# Patient Record
Sex: MALE | Race: WHITE | NOT HISPANIC OR LATINO | Employment: OTHER | URBAN - METROPOLITAN AREA
[De-identification: names, ages, dates, MRNs, and addresses within clinical notes are randomized per-mention and may not be internally consistent; named-entity substitution may affect disease eponyms.]

---

## 2021-05-29 ENCOUNTER — APPOINTMENT (OUTPATIENT)
Dept: RADIOLOGY | Facility: MEDICAL CENTER | Age: 66
End: 2021-05-29
Attending: EMERGENCY MEDICINE
Payer: MEDICARE

## 2021-05-29 ENCOUNTER — HOSPITAL ENCOUNTER (OUTPATIENT)
Facility: MEDICAL CENTER | Age: 66
End: 2021-05-30
Attending: EMERGENCY MEDICINE | Admitting: STUDENT IN AN ORGANIZED HEALTH CARE EDUCATION/TRAINING PROGRAM
Payer: MEDICARE

## 2021-05-29 LAB
ALBUMIN SERPL BCP-MCNC: 3.6 G/DL (ref 3.2–4.9)
ALBUMIN/GLOB SERPL: 1.1 G/DL
ALP SERPL-CCNC: 66 U/L (ref 30–99)
ALT SERPL-CCNC: 16 U/L (ref 2–50)
ANION GAP SERPL CALC-SCNC: 9 MMOL/L (ref 7–16)
AST SERPL-CCNC: 18 U/L (ref 12–45)
BASOPHILS # BLD AUTO: 0.8 % (ref 0–1.8)
BASOPHILS # BLD: 0.07 K/UL (ref 0–0.12)
BILIRUB SERPL-MCNC: 0.6 MG/DL (ref 0.1–1.5)
BUN SERPL-MCNC: 26 MG/DL (ref 8–22)
CALCIUM SERPL-MCNC: 9 MG/DL (ref 8.5–10.5)
CHLORIDE SERPL-SCNC: 109 MMOL/L (ref 96–112)
CO2 SERPL-SCNC: 23 MMOL/L (ref 20–33)
CREAT SERPL-MCNC: 0.8 MG/DL (ref 0.5–1.4)
EKG IMPRESSION: NORMAL
EOSINOPHIL # BLD AUTO: 0.48 K/UL (ref 0–0.51)
EOSINOPHIL NFR BLD: 5.6 % (ref 0–6.9)
ERYTHROCYTE [DISTWIDTH] IN BLOOD BY AUTOMATED COUNT: 48.9 FL (ref 35.9–50)
GLOBULIN SER CALC-MCNC: 3.3 G/DL (ref 1.9–3.5)
GLUCOSE SERPL-MCNC: 101 MG/DL (ref 65–99)
HCT VFR BLD AUTO: 46.2 % (ref 42–52)
HGB BLD-MCNC: 15.2 G/DL (ref 14–18)
IMM GRANULOCYTES # BLD AUTO: 0.03 K/UL (ref 0–0.11)
IMM GRANULOCYTES NFR BLD AUTO: 0.3 % (ref 0–0.9)
LYMPHOCYTES # BLD AUTO: 3.17 K/UL (ref 1–4.8)
LYMPHOCYTES NFR BLD: 36.7 % (ref 22–41)
MCH RBC QN AUTO: 29.9 PG (ref 27–33)
MCHC RBC AUTO-ENTMCNC: 32.9 G/DL (ref 33.7–35.3)
MCV RBC AUTO: 90.8 FL (ref 81.4–97.8)
MONOCYTES # BLD AUTO: 0.81 K/UL (ref 0–0.85)
MONOCYTES NFR BLD AUTO: 9.4 % (ref 0–13.4)
NEUTROPHILS # BLD AUTO: 4.08 K/UL (ref 1.82–7.42)
NEUTROPHILS NFR BLD: 47.2 % (ref 44–72)
NRBC # BLD AUTO: 0 K/UL
NRBC BLD-RTO: 0 /100 WBC
PLATELET # BLD AUTO: 227 K/UL (ref 164–446)
PMV BLD AUTO: 8.8 FL (ref 9–12.9)
POTASSIUM SERPL-SCNC: 3.8 MMOL/L (ref 3.6–5.5)
PROT SERPL-MCNC: 6.9 G/DL (ref 6–8.2)
RBC # BLD AUTO: 5.09 M/UL (ref 4.7–6.1)
SODIUM SERPL-SCNC: 141 MMOL/L (ref 135–145)
TROPONIN T SERPL-MCNC: 16 NG/L (ref 6–19)
WBC # BLD AUTO: 8.6 K/UL (ref 4.8–10.8)

## 2021-05-29 PROCEDURE — 93005 ELECTROCARDIOGRAM TRACING: CPT | Performed by: EMERGENCY MEDICINE

## 2021-05-29 PROCEDURE — 700111 HCHG RX REV CODE 636 W/ 250 OVERRIDE (IP): Performed by: EMERGENCY MEDICINE

## 2021-05-29 PROCEDURE — 83690 ASSAY OF LIPASE: CPT

## 2021-05-29 PROCEDURE — 84484 ASSAY OF TROPONIN QUANT: CPT

## 2021-05-29 PROCEDURE — 96374 THER/PROPH/DIAG INJ IV PUSH: CPT

## 2021-05-29 PROCEDURE — 85730 THROMBOPLASTIN TIME PARTIAL: CPT

## 2021-05-29 PROCEDURE — 99285 EMERGENCY DEPT VISIT HI MDM: CPT

## 2021-05-29 PROCEDURE — 93005 ELECTROCARDIOGRAM TRACING: CPT

## 2021-05-29 PROCEDURE — 85025 COMPLETE CBC W/AUTO DIFF WBC: CPT

## 2021-05-29 PROCEDURE — 80053 COMPREHEN METABOLIC PANEL: CPT

## 2021-05-29 PROCEDURE — 94760 N-INVAS EAR/PLS OXIMETRY 1: CPT

## 2021-05-29 PROCEDURE — 71045 X-RAY EXAM CHEST 1 VIEW: CPT

## 2021-05-29 PROCEDURE — 36415 COLL VENOUS BLD VENIPUNCTURE: CPT

## 2021-05-29 PROCEDURE — 85610 PROTHROMBIN TIME: CPT

## 2021-05-29 PROCEDURE — 96375 TX/PRO/DX INJ NEW DRUG ADDON: CPT

## 2021-05-29 RX ORDER — MORPHINE SULFATE 10 MG/ML
10 INJECTION, SOLUTION INTRAMUSCULAR; INTRAVENOUS ONCE
Status: COMPLETED | OUTPATIENT
Start: 2021-05-30 | End: 2021-05-29

## 2021-05-29 RX ORDER — ONDANSETRON 2 MG/ML
4 INJECTION INTRAMUSCULAR; INTRAVENOUS ONCE
Status: COMPLETED | OUTPATIENT
Start: 2021-05-30 | End: 2021-05-29

## 2021-05-29 RX ADMIN — MORPHINE SULFATE 10 MG: 10 INJECTION INTRAVENOUS at 23:40

## 2021-05-29 RX ADMIN — ONDANSETRON 4 MG: 2 INJECTION INTRAMUSCULAR; INTRAVENOUS at 23:40

## 2021-05-30 ENCOUNTER — APPOINTMENT (OUTPATIENT)
Dept: CARDIOLOGY | Facility: MEDICAL CENTER | Age: 66
End: 2021-05-30
Attending: STUDENT IN AN ORGANIZED HEALTH CARE EDUCATION/TRAINING PROGRAM
Payer: MEDICARE

## 2021-05-30 ENCOUNTER — APPOINTMENT (OUTPATIENT)
Dept: RADIOLOGY | Facility: MEDICAL CENTER | Age: 66
End: 2021-05-30
Attending: STUDENT IN AN ORGANIZED HEALTH CARE EDUCATION/TRAINING PROGRAM
Payer: MEDICARE

## 2021-05-30 ENCOUNTER — PATIENT OUTREACH (OUTPATIENT)
Dept: HEALTH INFORMATION MANAGEMENT | Facility: OTHER | Age: 66
End: 2021-05-30

## 2021-05-30 ENCOUNTER — APPOINTMENT (OUTPATIENT)
Dept: RADIOLOGY | Facility: MEDICAL CENTER | Age: 66
End: 2021-05-30
Attending: EMERGENCY MEDICINE
Payer: MEDICARE

## 2021-05-30 VITALS
HEART RATE: 60 BPM | BODY MASS INDEX: 44.1 KG/M2 | DIASTOLIC BLOOD PRESSURE: 75 MMHG | RESPIRATION RATE: 18 BRPM | WEIGHT: 315 LBS | OXYGEN SATURATION: 93 % | TEMPERATURE: 97.9 F | HEIGHT: 71 IN | SYSTOLIC BLOOD PRESSURE: 134 MMHG

## 2021-05-30 PROBLEM — E66.01 MORBID OBESITY WITH BODY MASS INDEX (BMI) OF 40.0 TO 44.9 IN ADULT (HCC): Status: ACTIVE | Noted: 2021-05-30

## 2021-05-30 PROBLEM — K21.9 GERD (GASTROESOPHAGEAL REFLUX DISEASE): Status: ACTIVE | Noted: 2021-05-30

## 2021-05-30 PROBLEM — R07.9 PAIN IN THE CHEST: Status: ACTIVE | Noted: 2021-05-30

## 2021-05-30 PROBLEM — R07.9 PAIN IN THE CHEST: Status: RESOLVED | Noted: 2021-05-30 | Resolved: 2021-05-30

## 2021-05-30 LAB
APTT PPP: 32.4 SEC (ref 24.7–36)
EKG IMPRESSION: NORMAL
INR PPP: 1.06 (ref 0.87–1.13)
LIPASE SERPL-CCNC: 13 U/L (ref 11–82)
LV EJECT FRACT MOD 2C 99903: 34.42
LV EJECT FRACT MOD 4C 99902: 54.91
LV EJECT FRACT MOD BP 99901: 46.73
PROTHROMBIN TIME: 14.1 SEC (ref 12–14.6)
SARS-COV-2 RNA RESP QL NAA+PROBE: NOTDETECTED
SPECIMEN SOURCE: NORMAL
TROPONIN T SERPL-MCNC: 13 NG/L (ref 6–19)

## 2021-05-30 PROCEDURE — 96372 THER/PROPH/DIAG INJ SC/IM: CPT | Mod: XU

## 2021-05-30 PROCEDURE — 700102 HCHG RX REV CODE 250 W/ 637 OVERRIDE(OP): Performed by: STUDENT IN AN ORGANIZED HEALTH CARE EDUCATION/TRAINING PROGRAM

## 2021-05-30 PROCEDURE — 74175 CTA ABDOMEN W/CONTRAST: CPT | Mod: ME

## 2021-05-30 PROCEDURE — 93306 TTE W/DOPPLER COMPLETE: CPT

## 2021-05-30 PROCEDURE — 700102 HCHG RX REV CODE 250 W/ 637 OVERRIDE(OP): Performed by: NURSE PRACTITIONER

## 2021-05-30 PROCEDURE — 84484 ASSAY OF TROPONIN QUANT: CPT

## 2021-05-30 PROCEDURE — 700101 HCHG RX REV CODE 250: Performed by: STUDENT IN AN ORGANIZED HEALTH CARE EDUCATION/TRAINING PROGRAM

## 2021-05-30 PROCEDURE — G0378 HOSPITAL OBSERVATION PER HR: HCPCS

## 2021-05-30 PROCEDURE — U0003 INFECTIOUS AGENT DETECTION BY NUCLEIC ACID (DNA OR RNA); SEVERE ACUTE RESPIRATORY SYNDROME CORONAVIRUS 2 (SARS-COV-2) (CORONAVIRUS DISEASE [COVID-19]), AMPLIFIED PROBE TECHNIQUE, MAKING USE OF HIGH THROUGHPUT TECHNOLOGIES AS DESCRIBED BY CMS-2020-01-R: HCPCS

## 2021-05-30 PROCEDURE — 96375 TX/PRO/DX INJ NEW DRUG ADDON: CPT | Mod: XU

## 2021-05-30 PROCEDURE — 93306 TTE W/DOPPLER COMPLETE: CPT | Mod: 26 | Performed by: STUDENT IN AN ORGANIZED HEALTH CARE EDUCATION/TRAINING PROGRAM

## 2021-05-30 PROCEDURE — 700117 HCHG RX CONTRAST REV CODE 255: Performed by: EMERGENCY MEDICINE

## 2021-05-30 PROCEDURE — 99236 HOSP IP/OBS SAME DATE HI 85: CPT | Performed by: STUDENT IN AN ORGANIZED HEALTH CARE EDUCATION/TRAINING PROGRAM

## 2021-05-30 PROCEDURE — 700111 HCHG RX REV CODE 636 W/ 250 OVERRIDE (IP): Performed by: EMERGENCY MEDICINE

## 2021-05-30 PROCEDURE — U0005 INFEC AGEN DETEC AMPLI PROBE: HCPCS

## 2021-05-30 PROCEDURE — 700117 HCHG RX CONTRAST REV CODE 255: Performed by: STUDENT IN AN ORGANIZED HEALTH CARE EDUCATION/TRAINING PROGRAM

## 2021-05-30 PROCEDURE — 700111 HCHG RX REV CODE 636 W/ 250 OVERRIDE (IP)

## 2021-05-30 PROCEDURE — 700111 HCHG RX REV CODE 636 W/ 250 OVERRIDE (IP): Performed by: STUDENT IN AN ORGANIZED HEALTH CARE EDUCATION/TRAINING PROGRAM

## 2021-05-30 PROCEDURE — 78452 HT MUSCLE IMAGE SPECT MULT: CPT | Mod: MH

## 2021-05-30 PROCEDURE — A9270 NON-COVERED ITEM OR SERVICE: HCPCS | Performed by: STUDENT IN AN ORGANIZED HEALTH CARE EDUCATION/TRAINING PROGRAM

## 2021-05-30 PROCEDURE — A9270 NON-COVERED ITEM OR SERVICE: HCPCS | Performed by: NURSE PRACTITIONER

## 2021-05-30 RX ORDER — LABETALOL HYDROCHLORIDE 5 MG/ML
10 INJECTION, SOLUTION INTRAVENOUS ONCE
Status: COMPLETED | OUTPATIENT
Start: 2021-05-30 | End: 2021-05-30

## 2021-05-30 RX ORDER — REGADENOSON 0.08 MG/ML
INJECTION, SOLUTION INTRAVENOUS
Status: COMPLETED
Start: 2021-05-30 | End: 2021-05-30

## 2021-05-30 RX ORDER — OMEPRAZOLE 20 MG/1
20 CAPSULE, DELAYED RELEASE ORAL 2 TIMES DAILY
Status: DISCONTINUED | OUTPATIENT
Start: 2021-05-30 | End: 2021-05-30 | Stop reason: HOSPADM

## 2021-05-30 RX ORDER — REGADENOSON 0.08 MG/ML
0.4 INJECTION, SOLUTION INTRAVENOUS ONCE
Status: COMPLETED | OUTPATIENT
Start: 2021-05-30 | End: 2021-05-30

## 2021-05-30 RX ORDER — HEPARIN SODIUM 5000 [USP'U]/ML
5000 INJECTION, SOLUTION INTRAVENOUS; SUBCUTANEOUS EVERY 8 HOURS
Status: DISCONTINUED | OUTPATIENT
Start: 2021-05-30 | End: 2021-05-30 | Stop reason: HOSPADM

## 2021-05-30 RX ORDER — ACETAMINOPHEN 325 MG/1
650 TABLET ORAL EVERY 6 HOURS PRN
Status: DISCONTINUED | OUTPATIENT
Start: 2021-05-30 | End: 2021-05-30 | Stop reason: HOSPADM

## 2021-05-30 RX ADMIN — ACETAMINOPHEN 650 MG: 325 TABLET, FILM COATED ORAL at 11:12

## 2021-05-30 RX ADMIN — REGADENOSON 0.4 MG: 0.08 INJECTION, SOLUTION INTRAVENOUS at 10:03

## 2021-05-30 RX ADMIN — OMEPRAZOLE 20 MG: 20 CAPSULE, DELAYED RELEASE ORAL at 11:41

## 2021-05-30 RX ADMIN — HUMAN ALBUMIN MICROSPHERES AND PERFLUTREN 3 ML: 10; .22 INJECTION, SOLUTION INTRAVENOUS at 08:36

## 2021-05-30 RX ADMIN — FENTANYL CITRATE 100 MCG: 50 INJECTION, SOLUTION INTRAMUSCULAR; INTRAVENOUS at 00:25

## 2021-05-30 RX ADMIN — IOHEXOL 90 ML: 350 INJECTION, SOLUTION INTRAVENOUS at 04:13

## 2021-05-30 RX ADMIN — LABETALOL HYDROCHLORIDE 10 MG: 5 INJECTION, SOLUTION INTRAVENOUS at 05:28

## 2021-05-30 RX ADMIN — HEPARIN SODIUM 5000 UNITS: 5000 INJECTION, SOLUTION INTRAVENOUS; SUBCUTANEOUS at 05:29

## 2021-05-30 ASSESSMENT — ENCOUNTER SYMPTOMS
NEUROLOGICAL NEGATIVE: 1
MUSCULOSKELETAL NEGATIVE: 1
RESPIRATORY NEGATIVE: 1
CONSTITUTIONAL NEGATIVE: 1
EYES NEGATIVE: 1
GASTROINTESTINAL NEGATIVE: 1
PSYCHIATRIC NEGATIVE: 1

## 2021-05-30 ASSESSMENT — LIFESTYLE VARIABLES
HOW MANY TIMES IN THE PAST YEAR HAVE YOU HAD 5 OR MORE DRINKS IN A DAY: 0
HAVE PEOPLE ANNOYED YOU BY CRITICIZING YOUR DRINKING: NO
TOTAL SCORE: 0
EVER FELT BAD OR GUILTY ABOUT YOUR DRINKING: NO
EVER HAD A DRINK FIRST THING IN THE MORNING TO STEADY YOUR NERVES TO GET RID OF A HANGOVER: NO
ALCOHOL_USE: NO
TOTAL SCORE: 0
HAVE YOU EVER FELT YOU SHOULD CUT DOWN ON YOUR DRINKING: NO
TOTAL SCORE: 0
CONSUMPTION TOTAL: NEGATIVE
ON A TYPICAL DAY WHEN YOU DRINK ALCOHOL HOW MANY DRINKS DO YOU HAVE: 0
DOES PATIENT WANT TO STOP DRINKING: NO
AVERAGE NUMBER OF DAYS PER WEEK YOU HAVE A DRINK CONTAINING ALCOHOL: 0

## 2021-05-30 ASSESSMENT — PATIENT HEALTH QUESTIONNAIRE - PHQ9
1. LITTLE INTEREST OR PLEASURE IN DOING THINGS: NOT AT ALL
2. FEELING DOWN, DEPRESSED, IRRITABLE, OR HOPELESS: NOT AT ALL
SUM OF ALL RESPONSES TO PHQ9 QUESTIONS 1 AND 2: 0

## 2021-05-30 ASSESSMENT — PAIN DESCRIPTION - PAIN TYPE
TYPE: CHRONIC PAIN
TYPE: CHRONIC PAIN
TYPE: ACUTE PAIN

## 2021-05-30 ASSESSMENT — FIBROSIS 4 INDEX: FIB4 SCORE: 1.29

## 2021-05-30 NOTE — ED TRIAGE NOTES
Chief Complaint   Patient presents with   • Chest Pain   • Arm Pain     Pt presents for L shoulder pain starting this morning. Pain persisted and got worse throughout the day. Pain began radiating down L arm when he called EMS. Pt had an episode of dizziness and clamminess upon EMS arrival. Given 324 aspirin, 2x nitro, and 50mcg fentanyl PTA

## 2021-05-30 NOTE — CARE PLAN
The patient is Stable - Low risk of patient condition declining or worsening         Progress made toward(s) clinical / shift goals:    Problem: Pain - Standard  Goal: Alleviation of pain or a reduction in pain to the patient’s comfort goal  Outcome: Progressing     Problem: Knowledge Deficit - Standard  Goal: Patient and family/care givers will demonstrate understanding of plan of care, disease process/condition, diagnostic tests and medications  Outcome: Progressing       Patient is not progressing towards the following goals:

## 2021-05-30 NOTE — DISCHARGE SUMMARY
Discharge Summary    CHIEF COMPLAINT ON ADMISSION  Chief Complaint   Patient presents with   • Chest Pain   • Arm Pain       Reason for Admission  EMS     Admission Date  5/29/2021    CODE STATUS  Full Code    HPI & HOSPITAL COURSE  This is a 66 y.o. male here with chest pain and chronic shoulder pain. EKG showed ventricular bigeminy. CTA thoracoabominal did not reveal aortic dissection or aneurysm. Given his risk factors, he was admitted for cardiac work-up including stress test and echocardiogram. Serial troponins were not elevated. Stress test was negative for ischemia, but did show reduced EF and inferolateral hypokinesis (likely from prior infarction). However, echocardiogram showed EF of 50% and low normal LV function. Since patient was from [place], he was discharged with instructions to follow-up with his PCP this week and obtain referral for cardiology follow-up.     Therefore, he is discharged in good and stable condition to home with close outpatient follow-up.    The patient recovered much more quickly than anticipated on admission.    Discharge Date  5/30/2021    FOLLOW UP ITEMS POST DISCHARGE  PCP   Cardiology     DISCHARGE DIAGNOSES  Principal Problem (Resolved):    Pain in the chest POA: Unknown  Active Problems:    Obstructive sleep apnea POA: Unknown    Hypertension (Chronic) POA: Yes    Morbid obesity with body mass index (BMI) of 40.0 to 44.9 in adult (HCC) POA: Unknown    GERD (gastroesophageal reflux disease) POA: Unknown      FOLLOW UP  No future appointments.  No follow-up provider specified.    MEDICATIONS ON DISCHARGE     Medication List      CONTINUE taking these medications      Instructions   Acidophilus 100 MG Caps   Take  by mouth.     albuterol 108 (90 Base) MCG/ACT Aers inhalation aerosol   Inhale 2 Puffs by mouth every 6 hours as needed for Shortness of Breath.  Dose: 2 Puff     aspirin 81 MG EC tablet   Take 1 Tab by mouth every day.  Dose: 81 mg     cyclobenzaprine 10 mg  "Tabs  Commonly known as: Flexeril   Take 10 mg by mouth 3 times a day as needed.  Dose: 10 mg     furosemide 80 MG Tabs  Commonly known as: LASIX   Take 80 mg by mouth 2 Times a Day.  Dose: 80 mg     ipratropium-albuterol 0.5-2.5 (3) MG/3ML nebulizer solution  Commonly known as: DUONEB   3 mL by Nebulization route every four hours as needed for Shortness of Breath.  Dose: 3 mL     lisinopril 40 MG tablet  Commonly known as: PRINIVIL   Take 1 Tab by mouth every day.  Dose: 40 mg     metFORMIN 500 MG Tabs  Commonly known as: GLUCOPHAGE   Take 500 mg by mouth 2 times a day, with meals.  Dose: 500 mg     metoprolol  MG Tb24  Commonly known as: TOPROL XL   Take 1 Tab by mouth every day.  Dose: 100 mg     potassium chloride 20 MEQ Pack  Commonly known as: KLOR-CON   Take 20 mEq by mouth 2 times a day.  Dose: 20 mEq     simvastatin 40 MG Tabs  Commonly known as: ZOCOR   Take 1 Tab by mouth every evening.  Dose: 40 mg            Allergies  Allergies   Allergen Reactions   • Vancomycin      \"red man syndrome\"       DIET  Orders Placed This Encounter   Procedures   • Diet Order Diet: Cardiac     Standing Status:   Standing     Number of Occurrences:   1     Order Specific Question:   Diet:     Answer:   Cardiac [6]       ACTIVITY  As tolerated.  Weight bearing as tolerated    CONSULTATIONS  None    PROCEDURES  N/A    LABORATORY  Lab Results   Component Value Date    SODIUM 141 05/29/2021    POTASSIUM 3.8 05/29/2021    CHLORIDE 109 05/29/2021    CO2 23 05/29/2021    GLUCOSE 101 (H) 05/29/2021    BUN 26 (H) 05/29/2021    CREATININE 0.80 05/29/2021        Lab Results   Component Value Date    WBC 8.6 05/29/2021    HEMOGLOBIN 15.2 05/29/2021    HEMATOCRIT 46.2 05/29/2021    PLATELETCT 227 05/29/2021          "

## 2021-05-30 NOTE — ED PROVIDER NOTES
ED Provider Note        Primary care provider: Pcp Pt States None    I verified that the patient was wearing a mask and I was wearing appropriate PPE every time I entered the room. The patient's mask was on the patient at all times during my encounter except for a brief view of the oropharynx.      CHIEF COMPLAINT  Chief Complaint   Patient presents with   • Chest Pain   • Arm Pain       HPI  Carlos Cerna is a 66 y.o. male who presents to the Emergency Department with chief complaint of chest pain arm pain.  Patient states he has had longstanding history of muscular spasms in his back that he has been having evaluated by his cardiologist and his primary care doctor.  States he had recent MRI that was negative for acute abnormality.  This evening he began having some worsening pain in his mid back he was having some minor nausea he then began having pain going down his left arm.  He reports that anytime he gets this pain he has trouble breathing it does not get worse with exertion.  He has had no headache no altered mental status no fevers he reports the pain wraps around from his back into his mid abdomen.  No problems with urination or bowel movements no fevers no chills reports that he has had a negative cardiac work-up recently.    REVIEW OF SYSTEMS  10 systems reviewed and otherwise negative, pertinent positives and negatives listed in the history of present illness.    PAST MEDICAL HISTORY   has a past medical history of Asthma, Cellulitis, Diabetes ([facility]), Hyperlipemia, Hypertension, Morbid obesity ([facility]), Osteoarthritis, PVD (peripheral vascular disease) ([facility]), Sleep apnea, and Venous insufficiency.    SURGICAL HISTORY   has a past surgical history that includes carpal tunnel release; shoulder arthroscopy; and polysomnography w cpap (7/21/2016).    SOCIAL HISTORY  Social History     Tobacco Use   • Smoking status: Never Smoker   Substance Use Topics   • Alcohol use: No   • Drug use: No      Social History  "    Substance and Sexual Activity   Drug Use No       FAMILY HISTORY  Non-Contributory    CURRENT MEDICATIONS  Home Medications    **Home medications have not yet been reviewed for this encounter**         ALLERGIES  Allergies   Allergen Reactions   • Vancomycin      \"red man syndrome\"       PHYSICAL EXAM  VITAL SIGNS: /93   Pulse 90   Temp 36.4 °C (97.5 °F)   Resp 16   Ht 1.803 m (5' 11\")   Wt (!) 141 kg (310 lb)   SpO2 95%   BMI 43.24 kg/m²   Pulse ox interpretation: I interpret this pulse ox as normal.  Constitutional: Alert and oriented x 3, moderate distress  HEENT: Atraumatic normocephalic, pupils are equal round, extraocular movements are intact. The nares is clear, external ears are normal, mouth shows moist mucous membranes  Neck: Supple, no JVD no tracheal deviation  Cardiovascular: Regular rate and rhythm no murmur rub or gallop   Thorax & Lungs: No respiratory distress, no wheezes rales or rhonchi, No chest tenderness.   GI: Soft nontender nondistended positive bowel sounds, no peritoneal signs  Skin: Warm dry no obvious acute rash or lesion  Musculoskeletal: Moving all extremities with normal range strength, no acute  deformity  Neurologic: Cranial nerves III through XII are grossly intact, no sensory deficit, no cerebellar dysfunction   Psychiatric: Appropriate affect for situation at this time      DIAGNOSTIC STUDIES / PROCEDURES  LABS      Results for orders placed or performed during the hospital encounter of 05/29/21   CBC with Differential   Result Value Ref Range    WBC 8.6 4.8 - 10.8 K/uL    RBC 5.09 4.70 - 6.10 M/uL    Hemoglobin 15.2 14.0 - 18.0 g/dL    Hematocrit 46.2 42.0 - 52.0 %    MCV 90.8 81.4 - 97.8 fL    MCH 29.9 27.0 - 33.0 pg    MCHC 32.9 (L) 33.7 - 35.3 g/dL    RDW 48.9 35.9 - 50.0 fL    Platelet Count 227 164 - 446 K/uL    MPV 8.8 (L) 9.0 - 12.9 fL    Neutrophils-Polys 47.20 44.00 - 72.00 %    Lymphocytes 36.70 22.00 - 41.00 %    Monocytes 9.40 0.00 - 13.40 %    " Eosinophils 5.60 0.00 - 6.90 %    Basophils 0.80 0.00 - 1.80 %    Immature Granulocytes 0.30 0.00 - 0.90 %    Nucleated RBC 0.00 /100 WBC    Neutrophils (Absolute) 4.08 1.82 - 7.42 K/uL    Lymphs (Absolute) 3.17 1.00 - 4.80 K/uL    Monos (Absolute) 0.81 0.00 - 0.85 K/uL    Eos (Absolute) 0.48 0.00 - 0.51 K/uL    Baso (Absolute) 0.07 0.00 - 0.12 K/uL    Immature Granulocytes (abs) 0.03 0.00 - 0.11 K/uL    NRBC (Absolute) 0.00 K/uL   Complete Metabolic Panel (CMP)   Result Value Ref Range    Sodium 141 135 - 145 mmol/L    Potassium 3.8 3.6 - 5.5 mmol/L    Chloride 109 96 - 112 mmol/L    Co2 23 20 - 33 mmol/L    Anion Gap 9.0 7.0 - 16.0    Glucose 101 (H) 65 - 99 mg/dL    Bun 26 (H) 8 - 22 mg/dL    Creatinine 0.80 0.50 - 1.40 mg/dL    Calcium 9.0 8.5 - 10.5 mg/dL    AST(SGOT) 18 12 - 45 U/L    ALT(SGPT) 16 2 - 50 U/L    Alkaline Phosphatase 66 30 - 99 U/L    Total Bilirubin 0.6 0.1 - 1.5 mg/dL    Albumin 3.6 3.2 - 4.9 g/dL    Total Protein 6.9 6.0 - 8.2 g/dL    Globulin 3.3 1.9 - 3.5 g/dL    A-G Ratio 1.1 g/dL   Troponin   Result Value Ref Range    Troponin T 16 6 - 19 ng/L   Lipase   Result Value Ref Range    Lipase 13 11 - 82 U/L   PT/INR   Result Value Ref Range    PT 14.1 12.0 - 14.6 sec    INR 1.06 0.87 - 1.13   APTT   Result Value Ref Range    APTT 32.4 24.7 - 36.0 sec   ESTIMATED GFR   Result Value Ref Range    GFR If African American >60 >60 mL/min/1.73 m 2    GFR If Non African American >60 >60 mL/min/1.73 m 2   SARS-CoV-2 PCR (24 hour In-House): Collect NP swab in VTM    Specimen: Nasopharyngeal; Respirate   Result Value Ref Range    SARS-CoV-2 Source NP Swab    TROPONIN   Result Value Ref Range    Troponin T 13 6 - 19 ng/L   EKG   Result Value Ref Range    Report       [facility] Emergency Dept.    Test Date:  2021-05-29  Pt Name:    TRISHA CORMIER              Department: ER  MRN:        [id number]                      Room:        09  Gender:     Male                         Technician:  [id number]  :        1955                   Requested By:ER TRIAGE PROTOCOL  Order #:    [id number]                    Reading MD:    Measurements  Intervals                                Axis  Rate:       80                           P:          -61  TX:         232                          QRS:        73  QRSD:       144                          T:          7  QT:         372  QTc:        430    Interpretive Statements  SINUS OR ECTOPIC ATRIAL RHYTHM  VENTRICULAR TRIGEMINY  FIRST DEGREE AV BLOCK  RIGHT BUNDLE BRANCH BLOCK  Compared to ECG 10/28/2015 15:08:41  Ectopic atrial rhythm now present  Ventricular premature complex(es) now present  Right bundle-branch block now present  Sinus rhythm no longer present     EKG   Result Value Ref Range    Report       [facility] Emergency Dept.    Test Date:  2021  Pt Name:    TRISHA CORMIER              Department: ER  MRN:        [id number]                      Room:       T214  Gender:     Male                         Technician: [id number]  :        1955                   Requested By:RITA HANSEN  Order #:    [id number]                    Reading MD:    Measurements  Intervals                                Axis  Rate:       96                           P:          52  TX:         188                          QRS:        76  QRSD:       144                          T:          7  QT:         384  QTc:        486    Interpretive Statements  SINUS RHYTHM  VENTRICULAR BIGEMINY  RIGHT BUNDLE BRANCH BLOCK  Compared to ECG 2021 23:08:43  Ectopic atrial rhythm no longer present  First degree AV block no longer present         All labs reviewed by me.      RADIOLOGY  CT-CTA COMPLETE THORACOABDOMINAL AORTA   Final Result   Addendum 1 of 1   Addendum:   Mentioned in the findings but not impression, fluid in the thoracic    esophagus to the thoracic inlet level indicates reflux but there is no    evidence of aspiration      Final     "  No aortic dissection, aneurysm, stenosis or occlusion      Medium-sized hiatal hernia following vertical sleeve gastrectomy      Pulmonary arterial hypertension      Hepatic steatosis            DX-CHEST-PORTABLE (1 VIEW)   Final Result      No radiographic evidence of acute cardiopulmonary process.      EC-ECHOCARDIOGRAM COMPLETE W/O CONT    (Results Pending)   NM-CARDIAC STRESS TEST    (Results Pending)     The radiologist's interpretation of all radiological studies have been reviewed by me.    COURSE & MEDICAL DECISION MAKING  Pertinent Labs & Imaging studies reviewed. (See chart for details)    11:26 PM - Patient seen and examined at bedside.     Coagulation studies were ordered in light of chest pain back pain concern for ACS or thoracic aortic abnormality.    Patient noted to have slightly elevated blood pressure likely circumstantial secondary to presenting complaint. Referred to primary care physician for further evaluation.      Medical Decision Making: EKG is nonischemic does show ventricular bigeminy his chest x-ray is negative his CTA of the thoracic aorta shows some fluid within the esophagus but no sign of aspiration patient has ongoing abdominal pain this pain is different than previous he had radiation of this pain in his extremities along with some nausea and some abnormal breathing.  Patient be admitted to the hospital service for ongoing evaluation and treatment.  I discussed this with Dr. Stephens is help with this patient's greatly appreciated is admitted in guarded condition.    BP (!) 163/77 Comment: alerted nurse  Pulse 90   Temp 36.2 °C (97.2 °F) (Temporal)   Resp 16   Ht 1.803 m (5' 11\")   Wt (!) 146 kg (320 lb 15.8 oz)   SpO2 89%   BMI 44.77 kg/m²             FINAL IMPRESSION  1.  Chest pain      This dictation has been created using voice recognition software and/or scribes. The accuracy of the dictation is limited by the abilities of the software and the expertise of the scribes. " I expect there may be some errors of grammar and possibly content. I made every attempt to manually correct the errors within my dictation. However, errors related to voice recognition software and/or scribes may still exist and should be interpreted within the appropriate context.

## 2021-05-30 NOTE — ASSESSMENT & PLAN NOTE
Admit patient to telemetry  Initial troponin x 1 negative, trend troponin  NPO, stress test in AM   TTE   CT angio negative for dissection  Telemetry monitoring

## 2021-05-30 NOTE — ASSESSMENT & PLAN NOTE
Blood pressure at bedside 200/87  Give labetalol 10 mg IV x1 now  Restart blood pressure medications

## 2021-05-30 NOTE — DISCHARGE INSTRUCTIONS
Discharge Instructions    Discharged to home by car with relative. Discharged via wheelchair, hospital escort: Yes.  Special equipment needed: Not Applicable    Be sure to schedule a follow-up appointment with your primary care doctor or any specialists as instructed.     Discharge Plan:   Diet Plan: Discussed  Activity Level: Discussed  Confirmed Follow up Appointment: Patient to Call and Schedule Appointment  Confirmed Symptoms Management: Discussed  Medication Reconciliation Updated: Yes    I understand that a diet low in cholesterol, fat, and sodium is recommended for good health. Unless I have been given specific instructions below for another diet, I accept this instruction as my diet prescription.   Other diet: heart healthy     Special Instructions: None    · Is patient discharged on Warfarin / Coumadin?   No     Depression / Suicide Risk    As you are discharged from this [facility] facility, it is important to learn how to keep safe from harming yourself.    Recognize the warning signs:  · Abrupt changes in personality, positive or negative- including increase in energy   · Giving away possessions  · Change in eating patterns- significant weight changes-  positive or negative  · Change in sleeping patterns- unable to sleep or sleeping all the time   · Unwillingness or inability to communicate  · Depression  · Unusual sadness, discouragement and loneliness  · Talk of wanting to die  · Neglect of personal appearance   · Rebelliousness- reckless behavior  · Withdrawal from people/activities they love  · Confusion- inability to concentrate     If you or a loved one observes any of these behaviors or has concerns about self-harm, here's what you can do:  · Talk about it- your feelings and reasons for harming yourself  · Remove any means that you might use to hurt yourself (examples: pills, rope, extension cords, firearm)  · Get professional help from the community (Mental Health, Substance Abuse,  psychological counseling)  · Do not be alone:Call your Safe Contact- someone whom you trust who will be there for you.  · Call your local CRISIS HOTLINE 255-1719 or 102-480-0376  · Call your local Children's Mobile Crisis Response Team [place] (329) 147-7592 or www.[employer]  · Call the toll free National Suicide Prevention Hotlines   · National Suicide Prevention Lifeline 734-759-EBQN (4921)  · [employer] Line Network 800-SUICIDE (141-8850)    Chest Pain Observation  It is often hard to give a specific diagnosis for the cause of chest pain. Among other possibilities your symptoms might be caused by inadequate oxygen delivery to your heart (angina). Angina that is not treated or evaluated can lead to a heart attack (myocardial infarction) or death.  Blood tests, electrocardiograms, and X-rays may have been done to help determine a possible cause of your chest pain. After evaluation and observation, your health care provider has determined that it is unlikely your pain was caused by an unstable condition that requires hospitalization. However, a full evaluation of your pain may need to be completed, with additional diagnostic testing as directed. It is very important to keep your follow-up appointments. Not keeping your follow-up appointments could result in permanent heart damage, disability, or death. If there is any problem keeping your follow-up appointments, you must call your health care provider.  HOME CARE INSTRUCTIONS   Due to the slight chance that your pain could be angina, it is important to follow your health care provider's treatment plan and also maintain a healthy lifestyle:  · Maintain or work toward achieving a healthy weight.  · Stay physically active and exercise regularly.  · Decrease your salt intake.  · Eat a balanced, healthy diet. Talk to a dietitian to learn about heart-healthy foods.  · Increase your fiber intake by including whole grains, vegetables, fruits, and nuts in  your diet.  · Avoid situations that cause stress, anger, or depression.  · Take medicines as advised by your health care provider. Report any side effects to your health care provider. Do not stop medicines or adjust the dosages on your own.  · Quit smoking. Do not use nicotine patches or gum until you check with your health care provider.  · Keep your blood pressure, blood sugar, and cholesterol levels within normal limits.  · Limit alcohol intake to no more than 1 drink per day for women who are not pregnant and 2 drinks per day for men.  · Do not abuse drugs.  SEEK IMMEDIATE MEDICAL CARE IF:  You have severe chest pain or pressure which may include symptoms such as:  · You feel pain or pressure in your arms, neck, jaw, or back.  · You have severe back or abdominal pain, feel sick to your stomach (nauseous), or throw up (vomit).  · You are sweating profusely.  · You are having a fast or irregular heartbeat.  · You feel short of breath while at rest.  · You notice increasing shortness of breath during rest, sleep, or with activity.  · You have chest pain that does not get better after rest or after taking your usual medicine.  · You wake from sleep with chest pain.  · You are unable to sleep because you cannot breathe.  · You develop a frequent cough or you are coughing up blood.  · You feel dizzy, faint, or experience extreme fatigue.  · You develop severe weakness, dizziness, fainting, or chills.  Any of these symptoms may represent a serious problem that is an emergency. Do not wait to see if the symptoms will go away. Call your local emergency services (911 in the [place] Do not drive yourself to the hospital.  MAKE SURE YOU:  · Understand these instructions.  · Will watch your condition.  · Will get help right away if you are not doing well or get worse.     This information is not intended to replace advice given to you by your health care provider. Make sure you discuss any questions you have with your health  care provider.     Document Released: 01/20/2012 Document Revised: 12/23/2014 Document Reviewed: 06/19/2014  Elsevier Interactive Patient Education ©2016 [employer]

## 2021-05-30 NOTE — CARE PLAN
The patient is Stable - Low risk of patient condition declining or worsening         Progress made toward(s) clinical / shift goals: draw labs and coordinate stress test.    Patient is not progressing towards the following goals:      Problem: Pain - Standard  Goal: Alleviation of pain or a reduction in pain to the patient’s comfort goal  Outcome: Progressing     Problem: Knowledge Deficit - Standard  Goal: Patient and family/care givers will demonstrate understanding of plan of care, disease process/condition, diagnostic tests and medications  Outcome: Progressing     Problem: Optimal Care for the Acute Coronary Syndrome Patient  Goal: Optimal Care for the Acute Coronary Syndrome Patient  Outcome: Progressing  Goal: Potential Interventions for the Acute Coronary Syndrome Patient  Outcome: Progressing

## 2021-05-30 NOTE — H&P
Hospital Medicine History & Physical Note    Date of Service  5/30/2021    Primary Care Physician  Pcp Pt States None    Consultants  None    Code Status  Full Code    Chief Complaint  Chief Complaint   Patient presents with   • Chest Pain   • Arm Pain       History of Presenting Illness  66 y.o. male who presented 5/29/2021 with shoulder and chest pain.  Patient states that he chronically has had shoulder pain and back pain and has been worked up for this in the past and was told it is likely is a nerve pain rather than cardiac.  However last night, he woke up with left shoulder pain that traveled down his left arm along with a pressure-like chest pain that would not subside and is much more intense than previously, prompting him to come to the ER for evaluation.    Of note, patient states that he was 200 pounds heavier 3 years ago.  At that time he had a gastric bypass done.    In the ED, patient found to have elevated blood pressure, other vital signs within normal limits.  Remarkable labs include prerenal azotemia.  Initial troponin was negative.  Chest x-ray negative for acute cardiopulmonary pathology.  Currently upon my interview with patient at bedside, he is asymptomatic at this time and reports that the pain is gone away.    Review of Systems  Review of Systems   Constitutional: Negative.    HENT: Negative.    Eyes: Negative.    Respiratory: Negative.    Cardiovascular: Positive for chest pain.   Gastrointestinal: Negative.    Genitourinary: Negative.    Musculoskeletal: Negative.    Skin: Negative.    Neurological: Negative.    Endo/Heme/Allergies: Negative.    Psychiatric/Behavioral: Negative.          Past Medical History   has a past medical history of Asthma, Cellulitis, Diabetes ([facility]), Hyperlipemia, Hypertension, Morbid obesity ([facility]), Osteoarthritis, PVD (peripheral vascular disease) ([facility]), Sleep apnea, and Venous insufficiency.    Surgical History   has a past surgical history that includes carpal  "tunnel release; shoulder arthroscopy; and pr polysomnography w cpap (7/21/2016).     Family History  No pertinent family history    Social History   reports that he has never smoked. He does not have any smokeless tobacco history on file. He reports that he does not drink alcohol and does not use drugs.    Allergies  Allergies   Allergen Reactions   • Vancomycin      \"red man syndrome\"       Medications  Prior to Admission Medications   Prescriptions Last Dose Informant Patient Reported? Taking?   Lactobacillus (ACIDOPHILUS) 100 MG Cap   Yes No   Sig: Take  by mouth.   albuterol (VENTOLIN OR PROVENTIL) 108 (90 BASE) MCG/ACT Aero Soln inhalation aerosol   Yes No   Sig: Inhale 2 Puffs by mouth every 6 hours as needed for Shortness of Breath.   aspirin EC 81 MG EC tablet   No No   Sig: Take 1 Tab by mouth every day.   cyclobenzaprine (FLEXERIL) 10 MG Tab   Yes No   Sig: Take 10 mg by mouth 3 times a day as needed.   furosemide (LASIX) 80 MG Tab   Yes No   Sig: Take 80 mg by mouth 2 Times a Day.   ipratropium-albuterol (DUONEB) 0.5-2.5 (3) MG/3ML nebulizer solution   No No   Sig: 3 mL by Nebulization route every four hours as needed for Shortness of Breath.   lisinopril (PRINIVIL, ZESTRIL) 40 MG tablet   No No   Sig: Take 1 Tab by mouth every day.   metformin (GLUCOPHAGE) 500 MG Tab   Yes No   Sig: Take 500 mg by mouth 2 times a day, with meals.   metoprolol SR (TOPROL XL) 100 MG TABLET SR 24 HR   No No   Sig: Take 1 Tab by mouth every day.   potassium chloride (KLOR-CON) 20 MEQ Pack   Yes No   Sig: Take 20 mEq by mouth 2 times a day.   simvastatin (ZOCOR) 40 MG Tab   No No   Sig: Take 1 Tab by mouth every evening.      Facility-Administered Medications: None       Physical Exam  Temp:  [36.4 °C (97.5 °F)] 36.4 °C (97.5 °F)  Pulse:  [79-91] 85  Resp:  [16-20] 20  BP: (111-182)/() 122/99  SpO2:  [93 %-96 %] 95 %    Physical Exam  Constitutional:       Appearance: Normal appearance. He is obese.   HENT:      Head: " Normocephalic.      Nose: Nose normal.      Mouth/Throat:      Mouth: Mucous membranes are moist.   Eyes:      Pupils: Pupils are equal, round, and reactive to light.   Cardiovascular:      Rate and Rhythm: Normal rate and regular rhythm.      Pulses: Normal pulses.      Comments: Unable to reproduce chest pain upon palpation  Pulmonary:      Effort: Pulmonary effort is normal.      Breath sounds: Normal breath sounds.   Abdominal:      General: Abdomen is flat. Bowel sounds are normal.      Palpations: Abdomen is soft.   Musculoskeletal:      Cervical back: Normal range of motion.      Comments: Moderate to severe chronic venous changes noted in lower extremities bilaterally   Skin:     General: Skin is warm.   Neurological:      General: No focal deficit present.      Mental Status: He is alert and oriented to person, place, and time. Mental status is at baseline.           Laboratory:  Recent Labs     05/29/21  2305   WBC 8.6   RBC 5.09   HEMOGLOBIN 15.2   HEMATOCRIT 46.2   MCV 90.8   MCH 29.9   MCHC 32.9*   RDW 48.9   PLATELETCT 227   MPV 8.8*     Recent Labs     05/29/21  2305   SODIUM 141   POTASSIUM 3.8   CHLORIDE 109   CO2 23   GLUCOSE 101*   BUN 26*   CREATININE 0.80   CALCIUM 9.0     Recent Labs     05/29/21  2305 05/29/21  2343   ALTSGPT 16  --    ASTSGOT 18  --    ALKPHOSPHAT 66  --    TBILIRUBIN 0.6  --    LIPASE  --  13   GLUCOSE 101*  --      Recent Labs     05/29/21  2343   APTT 32.4   INR 1.06     No results for input(s): NTPROBNP in the last 72 hours.      Recent Labs     05/29/21  2305   TROPONINT 16       Imaging:  DX-CHEST-PORTABLE (1 VIEW)   Final Result      No radiographic evidence of acute cardiopulmonary process.      CT-CTA COMPLETE THORACOABDOMINAL AORTA    (Results Pending)         Assessment/Plan:  I anticipate this patient is appropriate for observation status at this time.    * Pain in the chest  Assessment & Plan  Admit patient to telemetry  Initial troponin x 1 negative, trend  troponin  NPO, stress test in AM   TTE   CT angio negative for dissection  Telemetry monitoring       GERD (gastroesophageal reflux disease)  Assessment & Plan  Protonix 40 mg p.o. a.m.    Morbid obesity with body mass index (BMI) of 40.0 to 44.9 in adult ([facility])  Assessment & Plan  15 minutes spent counseling patient on benefits of nutrition weight loss and healthy lifestyle    Hypertension- (present on admission)  Assessment & Plan  Blood pressure at bedside 200/87  Give labetalol 10 mg IV x1 now  Restart blood pressure medications    Obstructive sleep apnea  Assessment & Plan  CPAP at night

## 2021-05-30 NOTE — PROGRESS NOTES
· 2 RN skin check complete.   · Devices in place na.  · Skin assessed under devices na.  · Confirmed pressure ulcers found on na.  · New potential pressure ulcers noted on na. Wound consult placed? na. Photo uploaded? na.   · The following interventions are in place na.

## 2023-05-03 ENCOUNTER — HOSPITAL ENCOUNTER (EMERGENCY)
Facility: MEDICAL CENTER | Age: 68
End: 2023-05-03
Attending: EMERGENCY MEDICINE
Payer: MEDICARE

## 2023-05-03 VITALS
RESPIRATION RATE: 14 BRPM | SYSTOLIC BLOOD PRESSURE: 141 MMHG | BODY MASS INDEX: 37.8 KG/M2 | TEMPERATURE: 98 F | WEIGHT: 270 LBS | HEIGHT: 71 IN | DIASTOLIC BLOOD PRESSURE: 99 MMHG | OXYGEN SATURATION: 94 % | HEART RATE: 93 BPM

## 2023-05-03 DIAGNOSIS — I48.91 ATRIAL FIBRILLATION WITH RVR (HCC): ICD-10-CM

## 2023-05-03 DIAGNOSIS — R04.0 EPISTAXIS: ICD-10-CM

## 2023-05-03 LAB
ANION GAP SERPL CALC-SCNC: 10 MMOL/L (ref 7–16)
APTT PPP: 42.8 SEC (ref 24.7–36)
BASOPHILS # BLD AUTO: 0.9 % (ref 0–1.8)
BASOPHILS # BLD: 0.08 K/UL (ref 0–0.12)
BUN SERPL-MCNC: 36 MG/DL (ref 8–22)
CALCIUM SERPL-MCNC: 8.9 MG/DL (ref 8.5–10.5)
CHLORIDE SERPL-SCNC: 107 MMOL/L (ref 96–112)
CO2 SERPL-SCNC: 21 MMOL/L (ref 20–33)
CREAT SERPL-MCNC: 0.71 MG/DL (ref 0.5–1.4)
EKG IMPRESSION: NORMAL
EOSINOPHIL # BLD AUTO: 0.25 K/UL (ref 0–0.51)
EOSINOPHIL NFR BLD: 2.7 % (ref 0–6.9)
ERYTHROCYTE [DISTWIDTH] IN BLOOD BY AUTOMATED COUNT: 55.9 FL (ref 35.9–50)
GFR SERPLBLD CREATININE-BSD FMLA CKD-EPI: 101 ML/MIN/1.73 M 2
GLUCOSE SERPL-MCNC: 104 MG/DL (ref 65–99)
HCT VFR BLD AUTO: 44.8 % (ref 42–52)
HGB BLD-MCNC: 14.6 G/DL (ref 14–18)
IMM GRANULOCYTES # BLD AUTO: 0.04 K/UL (ref 0–0.11)
IMM GRANULOCYTES NFR BLD AUTO: 0.4 % (ref 0–0.9)
INR PPP: 3.1 (ref 0.87–1.13)
LYMPHOCYTES # BLD AUTO: 2.13 K/UL (ref 1–4.8)
LYMPHOCYTES NFR BLD: 23 % (ref 22–41)
MCH RBC QN AUTO: 30 PG (ref 27–33)
MCHC RBC AUTO-ENTMCNC: 32.6 G/DL (ref 33.7–35.3)
MCV RBC AUTO: 92 FL (ref 81.4–97.8)
MONOCYTES # BLD AUTO: 0.88 K/UL (ref 0–0.85)
MONOCYTES NFR BLD AUTO: 9.5 % (ref 0–13.4)
NEUTROPHILS # BLD AUTO: 5.9 K/UL (ref 1.82–7.42)
NEUTROPHILS NFR BLD: 63.5 % (ref 44–72)
NRBC # BLD AUTO: 0 K/UL
NRBC BLD-RTO: 0 /100 WBC
PLATELET # BLD AUTO: 250 K/UL (ref 164–446)
PMV BLD AUTO: 8.6 FL (ref 9–12.9)
POTASSIUM SERPL-SCNC: 4.1 MMOL/L (ref 3.6–5.5)
PROTHROMBIN TIME: 30.9 SEC (ref 12–14.6)
RBC # BLD AUTO: 4.87 M/UL (ref 4.7–6.1)
SODIUM SERPL-SCNC: 138 MMOL/L (ref 135–145)
WBC # BLD AUTO: 9.3 K/UL (ref 4.8–10.8)

## 2023-05-03 PROCEDURE — 85025 COMPLETE CBC W/AUTO DIFF WBC: CPT

## 2023-05-03 PROCEDURE — 85730 THROMBOPLASTIN TIME PARTIAL: CPT

## 2023-05-03 PROCEDURE — 85610 PROTHROMBIN TIME: CPT

## 2023-05-03 PROCEDURE — 99285 EMERGENCY DEPT VISIT HI MDM: CPT

## 2023-05-03 PROCEDURE — 93005 ELECTROCARDIOGRAM TRACING: CPT | Mod: XE | Performed by: EMERGENCY MEDICINE

## 2023-05-03 PROCEDURE — 80048 BASIC METABOLIC PNL TOTAL CA: CPT

## 2023-05-03 PROCEDURE — 700102 HCHG RX REV CODE 250 W/ 637 OVERRIDE(OP): Performed by: EMERGENCY MEDICINE

## 2023-05-03 PROCEDURE — 96374 THER/PROPH/DIAG INJ IV PUSH: CPT | Mod: XU

## 2023-05-03 PROCEDURE — A9270 NON-COVERED ITEM OR SERVICE: HCPCS | Performed by: EMERGENCY MEDICINE

## 2023-05-03 PROCEDURE — 303620 HCHG EPISTAXIS CONTROL

## 2023-05-03 PROCEDURE — 36415 COLL VENOUS BLD VENIPUNCTURE: CPT

## 2023-05-03 PROCEDURE — 700101 HCHG RX REV CODE 250: Performed by: EMERGENCY MEDICINE

## 2023-05-03 RX ORDER — CARVEDILOL 3.12 MG/1
3.12 TABLET ORAL ONCE
Status: COMPLETED | OUTPATIENT
Start: 2023-05-03 | End: 2023-05-03

## 2023-05-03 RX ORDER — METOPROLOL TARTRATE 1 MG/ML
5 INJECTION, SOLUTION INTRAVENOUS ONCE
Status: COMPLETED | OUTPATIENT
Start: 2023-05-03 | End: 2023-05-03

## 2023-05-03 RX ORDER — AMOXICILLIN AND CLAVULANATE POTASSIUM 875; 125 MG/1; MG/1
1 TABLET, FILM COATED ORAL 2 TIMES DAILY
Qty: 10 TABLET | Refills: 0 | Status: ACTIVE | OUTPATIENT
Start: 2023-05-03 | End: 2023-05-08

## 2023-05-03 RX ORDER — OXYMETAZOLINE HYDROCHLORIDE 0.05 G/100ML
2 SPRAY NASAL 2 TIMES DAILY
Status: DISCONTINUED | OUTPATIENT
Start: 2023-05-03 | End: 2023-05-03

## 2023-05-03 RX ORDER — OXYMETAZOLINE HYDROCHLORIDE 0.05 G/100ML
2 SPRAY NASAL ONCE
Status: COMPLETED | OUTPATIENT
Start: 2023-05-03 | End: 2023-05-03

## 2023-05-03 RX ADMIN — OXYMETAZOLINE HCL 2 SPRAY: 0.05 SPRAY NASAL at 16:40

## 2023-05-03 RX ADMIN — METOPROLOL TARTRATE 5 MG: 1 INJECTION, SOLUTION INTRAVENOUS at 18:21

## 2023-05-03 RX ADMIN — CARVEDILOL 3.12 MG: 3.12 TABLET, FILM COATED ORAL at 16:32

## 2023-05-03 ASSESSMENT — FIBROSIS 4 INDEX: FIB4 SCORE: 1.31

## 2023-05-03 ASSESSMENT — PAIN DESCRIPTION - PAIN TYPE: TYPE: ACUTE PAIN

## 2023-05-03 NOTE — ED PROVIDER NOTES
ED Provider Note    CHIEF COMPLAINT  Chief Complaint   Patient presents with    Epistaxis       EXTERNAL RECORDS REVIEWED  Outpatient Notes Notes from [facility] reviewed to include multiple notes regarding his A-fib and abnormal weight loss.    HPI/ROS  LIMITATION TO HISTORY   Select: : None  OUTSIDE HISTORIAN(S):  Family son at bedside    Carlos Cerna is a 67 y.o. male who presents to the emergency department for persistent epistaxis.  Patient with recent diagnosis of A-fib.  Currently on Xarelto.  Has as needed Coreg at home.    Today awoke with left-sided epistaxis at home.  He was unable to get it controlled at home so therefore he presented to his local medical clinic in [place].  He said that he initially completed a cautery and then with a recurrent visit completed a procedure with cocaine.  Unfortunately even after these evaluations and discharges he had persistent bleeding and was therefore sent to this hospital for possible ENT consultation.    He states that he has otherwise been feeling relatively well.  Denies any trauma or picking of nose.    PAST MEDICAL HISTORY   has a past medical history of A-fib ([facility]), Asthma, Cellulitis, Diabetes ([facility]), Hyperlipemia, Hypertension, Morbid obesity ([facility]), Osteoarthritis, PVD (peripheral vascular disease) ([facility]), Sleep apnea, and Venous insufficiency.    SURGICAL HISTORY   has a past surgical history that includes carpal tunnel release; shoulder arthroscopy; and polysomnography w cpap (7/21/2016).    FAMILY HISTORY  History reviewed. No pertinent family history.    SOCIAL HISTORY  Social History     Tobacco Use    Smoking status: Never    Smokeless tobacco: Not on file   Substance and Sexual Activity    Alcohol use: No    Drug use: No    Sexual activity: Not on file       CURRENT MEDICATIONS  Home Medications       Reviewed by Rosmery Webb R.N. (Registered Nurse) on 05/03/23 at 1523  Med List Status: <None>     Medication Last Dose Status   albuterol  "(VENTOLIN OR PROVENTIL) 108 (90 BASE) MCG/ACT Aero Soln inhalation aerosol  Active   aspirin EC 81 MG EC tablet  Active   cyclobenzaprine (FLEXERIL) 10 MG Tab  Active   furosemide (LASIX) 80 MG Tab  Active   ipratropium-albuterol (DUONEB) 0.5-2.5 (3) MG/3ML nebulizer solution  Active   Lactobacillus (ACIDOPHILUS) 100 MG Cap  Active   lisinopril (PRINIVIL, ZESTRIL) 40 MG tablet  Active   metformin (GLUCOPHAGE) 500 MG Tab  Active   metoprolol SR (TOPROL XL) 100 MG TABLET SR 24 HR  Active   potassium chloride (KLOR-CON) 20 MEQ Pack  Active   simvastatin (ZOCOR) 40 MG Tab  Active                    ALLERGIES  Allergies   Allergen Reactions    Vancomycin      \"red man syndrome\"       PHYSICAL EXAM  VITAL SIGNS: BP (!) 141/99   Pulse 93   Temp 36.7 °C (98 °F)   Resp 14   Ht 1.803 m (5' 11\")   Wt 122 kg (270 lb)   SpO2 94%   BMI 37.66 kg/m²      Pulse ox interpretation: I interpret this pulse ox as normal.  Constitutional: Alert in no apparent distress.  HENT: No signs of trauma, Bilateral external ears normal.  Bilateral nares plugged with clotted heme.  Blood clots also noted hanging in the posterior pharynx  Eyes: Pupils are equal and reactive  Neck: Normal range of motion, No tenderness, Supple  Cardiovascular: Irregularly irregular and tachycardic  Thorax & Lungs: Normal breath sounds, No respiratory distress, No wheezing, No chest tenderness.   Abdomen: Bowel sounds normal, Soft, No tenderness  Skin: Warm, Dry, No erythema, No rash.   Extremities: Intact distal pulses, No edema, No tenderness  Musculoskeletal: Good range of motion in all major joints. No tenderness to palpation or major deformities noted.   Neurologic: Alert , Normal motor function, Normal sensory function, No focal deficits noted.   Psychiatric: Affect normal, Judgment normal, Mood normal.         DIAGNOSTIC STUDIES / PROCEDURES      LABS  Results for orders placed or performed during the hospital encounter of 05/03/23   CBC w/ Differential "   Result Value Ref Range    WBC 9.3 4.8 - 10.8 K/uL    RBC 4.87 4.70 - 6.10 M/uL    Hemoglobin 14.6 14.0 - 18.0 g/dL    Hematocrit 44.8 42.0 - 52.0 %    MCV 92.0 81.4 - 97.8 fL    MCH 30.0 27.0 - 33.0 pg    MCHC 32.6 (L) 33.7 - 35.3 g/dL    RDW 55.9 (H) 35.9 - 50.0 fL    Platelet Count 250 164 - 446 K/uL    MPV 8.6 (L) 9.0 - 12.9 fL    Neutrophils-Polys 63.50 44.00 - 72.00 %    Lymphocytes 23.00 22.00 - 41.00 %    Monocytes 9.50 0.00 - 13.40 %    Eosinophils 2.70 0.00 - 6.90 %    Basophils 0.90 0.00 - 1.80 %    Immature Granulocytes 0.40 0.00 - 0.90 %    Nucleated RBC 0.00 /100 WBC    Neutrophils (Absolute) 5.90 1.82 - 7.42 K/uL    Lymphs (Absolute) 2.13 1.00 - 4.80 K/uL    Monos (Absolute) 0.88 (H) 0.00 - 0.85 K/uL    Eos (Absolute) 0.25 0.00 - 0.51 K/uL    Baso (Absolute) 0.08 0.00 - 0.12 K/uL    Immature Granulocytes (abs) 0.04 0.00 - 0.11 K/uL    NRBC (Absolute) 0.00 K/uL   Basic Metabolic Panel (BMP)   Result Value Ref Range    Sodium 138 135 - 145 mmol/L    Potassium 4.1 3.6 - 5.5 mmol/L    Chloride 107 96 - 112 mmol/L    Co2 21 20 - 33 mmol/L    Glucose 104 (H) 65 - 99 mg/dL    Bun 36 (H) 8 - 22 mg/dL    Creatinine 0.71 0.50 - 1.40 mg/dL    Calcium 8.9 8.5 - 10.5 mg/dL    Anion Gap 10.0 7.0 - 16.0   PT/INR   Result Value Ref Range    PT 30.9 (H) 12.0 - 14.6 sec    INR 3.10 (H) 0.87 - 1.13   APTT   Result Value Ref Range    APTT 42.8 (H) 24.7 - 36.0 sec   ESTIMATED GFR   Result Value Ref Range    GFR (CKD-EPI) 101 >60 mL/min/1.73 m 2   EKG   Result Value Ref Range    Report       [facility] Emergency Dept.    Test Date:  2023  Pt Name:    TRISHA CORMIER              Department: ER  MRN:        [id number]                      Room:       ORTHO  Gender:     Male                         Technician: [id number]  :        [id number]                   Requested By:ER TRIAGE PROTOCOL  Order #:    [id number]                    Reading MD:    Measurements  Intervals                                 Axis  Rate:       118                          P:  AK:                                      QRS:        48  QRSD:       138                          T:          -4  QT:         360  QTc:        505    Interpretive Statements  Atrial fibrillation  Right bundle branch block  Compared to ECG 05/30/2021 00:04:49  Sinus rhythm no longer present  Ventricular premature complex(es) no longer present       Procedure note: Epistaxis care: Patient with persistent bleeding out of the left nares.  Visualization of the bleeding site is unsuccessful.  After significant suctioning both using Yankauer in the posterior pharynx and nasal suction device in bilateral nares long posterior nasal packing was placed in the left nares after saturating it with ox metolazone.  Additional soft nasal packing in the right nare with cotton was later added.  Ultimately there was good blood sensation and good remnant evacuation of remaining blood clots in the posterior pharynx.    CRITICAL CARE  The very real possibilty of a deterioration of this patient's condition required the highest level of my preparedness for sudden, emergent intervention.  I provided critical care services, which included medication orders, frequent reevaluations of the patient's condition and response to treatment, ordering and reviewing test results, and discussing the case with various consultants.  The critical care time associated with the care of the patient was 35 minutes. Review chart for interventions. This time is exclusive of any other billable procedures.       COURSE & MEDICAL DECISION MAKING    ED Observation Status? Yes; I am placing the patient in to an observation status due to a diagnostic uncertainty as well as therapeutic intensity. Patient placed in observation status at 1700 , 5/3/2023.     Observation plan is as follows: We will continue with ongoing epistaxis care.  Please see procedure note for all ongoing reevaluations    Upon Reevaluation, the  patient's condition has: Improved; and will be discharged.    Patient discharged from ED Observation status at 1913 (Time) 5/3/2023 (Date).     INITIAL ASSESSMENT, COURSE AND PLAN  Care Narrative: Patient presented emerged department with persistent epistaxis.  Patient had received silver nitrate and cocaine application earlier in the day with persistent bleeding.  At this point I have emergently escalated to nasal packing.  Will check hematologic evaluation for any significant drop in hemoglobin.    Furthermore the patient has a history of A-fib and currently in RVR.  We will add acute rate control agents at this time.  Will initiate his home oral medication first.    DISPOSITION AND DISCUSSIONS  I have discussed management of the patient with the following physicians and JULIAN's: None    Discussion of management with other [facility] or appropriate source(s): Pharmacy for medication verification      Escalation of care considered, and ultimately not performed:diagnostic imaging and acute inpatient care management, however at this time, the patient is most appropriate for outpatient management    Barriers to care at this time, including but not limited to:  Lives in [place] .     Decision tools and prescription drugs considered including, but not limited to: Antibiotics provided given nasal packing .    67-year-old male presenting to the emergency department with the above presentation.  Primarily here for epistaxis care.  Please see procedure note for epistaxis care and ultimate resolution of this.  Given the fact the nasal packing was placed and will be kept in place the patient has been started on prophylactic antibiotics.  These have been sent to his local pharmacy.  He is also been referred to ENT for outpatient follow-up.  He is understanding return precautions for this.    As stated above the patient was additionally identified as having A-fib with RVR.  Longstanding history of A-fib and on anticoagulation which is  likely causing the complication of persistent bleeding with a nosebleed.  At this time I will provide him with his regular Coreg.  Ultimately patient requiring additional IV metoprolol for appropriate rate control.  Again we have had improved control of his heart rate, blood pressure and resolution of his epistaxis.    Patient is understanding of return precautions, outpatient follow-up and ongoing home care  FINAL DIAGNOSIS  1. Epistaxis    2. Atrial fibrillation with RVR (HCC)           Electronically signed by: Meir Cedeno M.D., 5/3/2023 4:58 PM

## 2023-05-03 NOTE — ED TRIAGE NOTES
Pt comes in reporting a nose bleed starting this morning. Pt reporting he was seen in [place] ED this morning and they were unable to stop it. Pt actively bleed in triage. Nose clamp in place. Pt does take xarelto for afib

## 2023-05-04 NOTE — ED NOTES
Pt discharged to ED Lobby. GCS 15. IV discontinued and gauze placed, pt in possession of belongings. Pt provided discharge education and information pertaining to medications and follow up appointments. Pt received copy of discharge instructions and verbalized understanding.     Vitals:    05/03/23 1857   BP: (!) 141/99   Pulse: 93   Resp: 18   Temp: 36.7 °C (98 °F)   SpO2: 94%

## 2023-05-04 NOTE — ED NOTES
Patient bedside report received from YI Solorzano . Pt AAO X 4 , respirations even and unlabored, on room air . Plan for Discharge.